# Patient Record
Sex: MALE | Race: WHITE | Employment: FULL TIME | URBAN - METROPOLITAN AREA
[De-identification: names, ages, dates, MRNs, and addresses within clinical notes are randomized per-mention and may not be internally consistent; named-entity substitution may affect disease eponyms.]

---

## 2017-06-28 ENCOUNTER — OFFICE VISIT (OUTPATIENT)
Dept: ORTHOPEDIC SURGERY | Age: 57
End: 2017-06-28

## 2017-06-28 VITALS
HEART RATE: 85 BPM | HEIGHT: 68 IN | WEIGHT: 285.06 LBS | SYSTOLIC BLOOD PRESSURE: 131 MMHG | DIASTOLIC BLOOD PRESSURE: 85 MMHG | BODY MASS INDEX: 43.2 KG/M2

## 2017-06-28 DIAGNOSIS — M25.562 PAIN, JOINT, KNEE, LEFT: Primary | ICD-10-CM

## 2017-06-28 PROCEDURE — 99214 OFFICE O/P EST MOD 30 MIN: CPT | Performed by: ORTHOPAEDIC SURGERY

## 2017-06-28 PROCEDURE — 73562 X-RAY EXAM OF KNEE 3: CPT | Performed by: ORTHOPAEDIC SURGERY

## 2017-06-28 RX ORDER — DICLOFENAC SODIUM 75 MG/1
75 TABLET, DELAYED RELEASE ORAL 2 TIMES DAILY
Qty: 60 TABLET | Refills: 1 | Status: SHIPPED | OUTPATIENT
Start: 2017-06-28 | End: 2017-06-30 | Stop reason: SDUPTHER

## 2017-06-30 RX ORDER — DICLOFENAC SODIUM 75 MG/1
75 TABLET, DELAYED RELEASE ORAL 2 TIMES DAILY
Qty: 60 TABLET | Refills: 1 | Status: SHIPPED | OUTPATIENT
Start: 2017-06-30 | End: 2017-08-28 | Stop reason: SDUPTHER

## 2017-07-11 ENCOUNTER — OFFICE VISIT (OUTPATIENT)
Dept: ORTHOPEDIC SURGERY | Age: 57
End: 2017-07-11

## 2017-07-11 VITALS
DIASTOLIC BLOOD PRESSURE: 74 MMHG | BODY MASS INDEX: 43.2 KG/M2 | WEIGHT: 285.06 LBS | SYSTOLIC BLOOD PRESSURE: 136 MMHG | HEART RATE: 87 BPM | HEIGHT: 68 IN

## 2017-07-11 DIAGNOSIS — E11.9 CONTROLLED TYPE 2 DIABETES MELLITUS WITHOUT COMPLICATION, UNSPECIFIED LONG TERM INSULIN USE STATUS: Primary | ICD-10-CM

## 2017-07-11 PROCEDURE — 99202 OFFICE O/P NEW SF 15 MIN: CPT | Performed by: PODIATRIST

## 2017-07-11 RX ORDER — PREDNISONE 10 MG/1
TABLET ORAL
COMMUNITY
Start: 2017-03-13 | End: 2017-12-14

## 2017-08-28 RX ORDER — DICLOFENAC SODIUM 75 MG/1
75 TABLET, DELAYED RELEASE ORAL 2 TIMES DAILY
Qty: 60 TABLET | Refills: 1 | Status: SHIPPED | OUTPATIENT
Start: 2017-08-28 | End: 2017-08-29 | Stop reason: SDUPTHER

## 2017-08-30 RX ORDER — DICLOFENAC SODIUM 75 MG/1
75 TABLET, DELAYED RELEASE ORAL 2 TIMES DAILY
Qty: 60 TABLET | Refills: 1 | Status: SHIPPED | OUTPATIENT
Start: 2017-08-30 | End: 2017-12-19 | Stop reason: HOSPADM

## 2017-10-18 ENCOUNTER — OFFICE VISIT (OUTPATIENT)
Dept: ORTHOPEDIC SURGERY | Age: 57
End: 2017-10-18

## 2017-10-18 VITALS — WEIGHT: 285.06 LBS | HEIGHT: 68 IN | BODY MASS INDEX: 43.2 KG/M2

## 2017-10-18 DIAGNOSIS — M17.12 PRIMARY OSTEOARTHRITIS OF LEFT KNEE: Primary | ICD-10-CM

## 2017-10-18 PROCEDURE — 20611 DRAIN/INJ JOINT/BURSA W/US: CPT | Performed by: ORTHOPAEDIC SURGERY

## 2017-10-18 PROCEDURE — 99213 OFFICE O/P EST LOW 20 MIN: CPT | Performed by: ORTHOPAEDIC SURGERY

## 2017-10-18 PROCEDURE — 76942 ECHO GUIDE FOR BIOPSY: CPT | Performed by: ORTHOPAEDIC SURGERY

## 2017-10-18 PROCEDURE — L1812 KO ELASTIC W/JOINTS PRE OTS: HCPCS | Performed by: ORTHOPAEDIC SURGERY

## 2017-10-18 NOTE — PROGRESS NOTES
CHIEF COMPLAINT:    Chief Complaint   Patient presents with    Follow-up     LEFT KNEE- WALKING LAST FRIDAY AND FELT A SHARP PAIN WHILE STEPPING DOWN. PAIN HAS BEEN INCREASING SINCE. HISTORY OF PRESENT ILLNESS:                The patient is a 65 y.o. male returns to clinic with continued left knee pain. We've seen this patient in the past for left knee pain. He does have known moderate arthritic changes. They've of this past weekend, he aggravated his knee when walking. He is planning on traveling out of the state tomorrow. Past Medical History:   Diagnosis Date    Pneumonia 8/2014    Reflux           The pain assessment was noted & is as follows:  Pain Assessment  Location of Pain: Knee  Location Modifiers: Left  Severity of Pain: 8  Quality of Pain: Aching, Sharp  Duration of Pain: Persistent  Frequency of Pain: Intermittent  Aggravating Factors: Bending, Exercise, Kneeling, Squatting, Walking  Limiting Behavior: Yes  Relieving Factors: Rest]      Work Status/Functionality:     Past Medical History: Medical history form was reviewed today & can be found in the media tab  Past Medical History:   Diagnosis Date    Pneumonia 8/2014    Reflux       Past Surgical History:     Past Surgical History:   Procedure Laterality Date    COLONOSCOPY      5/6/2011    HAND SURGERY Left     trigger thumb    KNEE ARTHROSCOPY Right 10/30/2014    TONSILLECTOMY      VASECTOMY      WISDOM TOOTH EXTRACTION       Current Medications:     Current Outpatient Prescriptions:     diclofenac (VOLTAREN) 75 MG EC tablet, Take 1 tablet by mouth 2 times daily, Disp: 60 tablet, Rfl: 1    predniSONE (DELTASONE) 10 MG tablet, 40mg x 2 days, 30mg x 2 days, 20mg x 2 days, 10mg x 2 days. , Disp: , Rfl:     Liraglutide (VICTOZA) 18 MG/3ML SOPN SC injection, DIAL AND INJECT SUBCUTANEOUSLY 1.8MG DAILY, Disp: , Rfl:     glimepiride (AMARYL) 4 MG tablet, , Disp: , Rfl: 3    ibuprofen (ADVIL) 600 MG tablet, Take 1 tablet by mouth every 6 hours as needed for Pain., Disp: 40 tablet, Rfl: 0  Allergies:  Review of patient's allergies indicates no known allergies. Social History:    reports that he has never smoked. He has never used smokeless tobacco. He reports that he drinks alcohol. He reports that he does not use drugs. Family History:   Family History   Problem Relation Age of Onset    Heart Disease Mother     High Blood Pressure Mother     Heart Disease Father     Diabetes Father     High Blood Pressure Father     Heart Disease Paternal Grandfather        REVIEW OF SYSTEMS:   For new problems, a full review of systems will be found scanned in the patient's chart. CONSTITUTIONAL: Denies unexplained weight loss, fevers, chills   NEUROLOGICAL: Denies unsteady gait or progressive weakness  SKIN: Denies skin changes, delayed healing, rash, itching       PHYSICAL EXAM:    Vitals: Height 5' 8.11\" (1.73 m), weight 285 lb 0.9 oz (129.3 kg). GENERAL EXAM:  · General Apparence: Patient is adequately groomed with no evidence of malnutrition. · Orientation: The patient is oriented to time, place and person. · Mood & Affect:The patient's mood and affect are appropriate       Left knee PHYSICAL EXAMINATION:  · Inspection:  No visible deformity. No significant edema, erythema or ecchymosis. · Palpation:  Tenderness to palpation along the medial and lateral joint spaces      · Range of Motion: Normal range of motion    · Strength: No gross strength deficits are noted    · Special Tests:  Varus and valgus stress testing are normal.  Negative Homans testing. Negative Lockman's testing. · Skin:  There are no rashes, ulcerations or lesions. · Gait & station: Mildly antalgic      · Additional Examinations:        Right Lower Extremity: Examination of the right lower extremity does not show any tenderness, deformity or injury. Range of motion is unremarkable. There is no gross instability.   There are no rashes, ulcerations or lesions. Strength and tone are normal.      Diagnostic Testing:          Orders     Orders Placed This Encounter   Procedures    US Guided Needle Placement    KS ARTHROCENTESIS ASPIR&/INJ MAJOR JT/BURSA W/O US    KS METHYLPREDNISOLONE 40 MG INJ    Breg Economy Hinged Knee WrapAround Brace     Patient was prescribed a Breg Economy Hinged Wrap Around Knee Brace. The left knee will require stabilization / immobilization from this semi-rigid / rigid orthosis to improve their function. The orthosis will assist in protecting the affected area, provide functional support and facilitate healing. The patient was educated and fit by a healthcare professional with expert knowledge and specialization in brace application while under the direct supervision of the treating physician. Verbal and written instructions for the use of and application of this item were provided. They were instructed to contact the office immediately should the brace result in increased pain, decreased sensation, increased swelling or worsening of the condition. Assessment / Treatment Plan:     1. Osteoarthritis left knee, possible degenerative medial meniscus tear    I discussed with the patient the nature of osteoarthritis of the knee. We talked about treatment of arthritis and the various options that are involved with this. The patient understands that the treatments can vary from essentially doing nothing to a total joint replacement arthroplasty for arthritis. I then went on to describe the utilization of glucosamine and chondroitin sulfate as a joint nutrition product. We talked about the fact that this is essentially a joint vitamin with typically minimal side effects. We also talked about utilization of prescription over-the-counter anti-inflammatory medications as the next option. We also discussed the possibility of brace wear or orthotic wear if the patient has significant varus alignment.  We then went on

## 2017-11-22 ENCOUNTER — TELEPHONE (OUTPATIENT)
Dept: ORTHOPEDIC SURGERY | Age: 57
End: 2017-11-22

## 2017-11-22 DIAGNOSIS — S83.242A TEAR OF MEDIAL MENISCUS OF LEFT KNEE, UNSPECIFIED TEAR TYPE, UNSPECIFIED WHETHER OLD OR CURRENT TEAR, INITIAL ENCOUNTER: Primary | ICD-10-CM

## 2017-12-04 ENCOUNTER — OFFICE VISIT (OUTPATIENT)
Dept: ORTHOPEDIC SURGERY | Age: 57
End: 2017-12-04

## 2017-12-04 VITALS
DIASTOLIC BLOOD PRESSURE: 90 MMHG | WEIGHT: 285.06 LBS | HEIGHT: 68 IN | SYSTOLIC BLOOD PRESSURE: 169 MMHG | BODY MASS INDEX: 43.2 KG/M2 | HEART RATE: 83 BPM

## 2017-12-04 DIAGNOSIS — M17.12 PRIMARY OSTEOARTHRITIS OF LEFT KNEE: ICD-10-CM

## 2017-12-04 DIAGNOSIS — S83.212A BUCKET-HANDLE TEAR OF MEDIAL MENISCUS OF LEFT KNEE AS CURRENT INJURY, INITIAL ENCOUNTER: Primary | ICD-10-CM

## 2017-12-04 DIAGNOSIS — S83.242A TEAR OF MEDIAL MENISCUS OF LEFT KNEE, UNSPECIFIED TEAR TYPE, UNSPECIFIED WHETHER OLD OR CURRENT TEAR, INITIAL ENCOUNTER: Primary | ICD-10-CM

## 2017-12-04 PROCEDURE — 99213 OFFICE O/P EST LOW 20 MIN: CPT | Performed by: ORTHOPAEDIC SURGERY

## 2017-12-04 NOTE — PROGRESS NOTES
CHIEF COMPLAINT:    Chief Complaint   Patient presents with    Results     MRI LEFT KNEE        HISTORY OF PRESENT ILLNESS:                The patient is a 65 y.o. male  who returns to clinic to review his MRI results of his LEFT knee. He states that he is having significant pain over the medial aspect of the knee he reports pain with weightbearing. Past Medical History:   Diagnosis Date    Pneumonia 8/2014    Reflux           The pain assessment was noted & is as follows:  Pain Assessment  Location of Pain: Knee  Location Modifiers: Left  Severity of Pain: 5  Quality of Pain: Aching  Duration of Pain: Persistent  Frequency of Pain: Intermittent  Aggravating Factors: Bending, Exercise, Standing, Walking  Limiting Behavior: Yes  Relieving Factors: Rest]      Work Status/Functionality:     Past Medical History: Medical history form was reviewed today & can be found in the media tab  Past Medical History:   Diagnosis Date    Pneumonia 8/2014    Reflux       Past Surgical History:     Past Surgical History:   Procedure Laterality Date    COLONOSCOPY      5/6/2011    HAND SURGERY Left     trigger thumb    KNEE ARTHROSCOPY Right 10/30/2014    TONSILLECTOMY      VASECTOMY      WISDOM TOOTH EXTRACTION       Current Medications:     Current Outpatient Prescriptions:     diclofenac (VOLTAREN) 75 MG EC tablet, Take 1 tablet by mouth 2 times daily, Disp: 60 tablet, Rfl: 1    predniSONE (DELTASONE) 10 MG tablet, 40mg x 2 days, 30mg x 2 days, 20mg x 2 days, 10mg x 2 days. , Disp: , Rfl:     Liraglutide (VICTOZA) 18 MG/3ML SOPN SC injection, DIAL AND INJECT SUBCUTANEOUSLY 1.8MG DAILY, Disp: , Rfl:     glimepiride (AMARYL) 4 MG tablet, , Disp: , Rfl: 3    ibuprofen (ADVIL) 600 MG tablet, Take 1 tablet by mouth every 6 hours as needed for Pain., Disp: 40 tablet, Rfl: 0  Allergies:  Review of patient's allergies indicates no known allergies. Social History:    reports that he has never smoked.  He has never used

## 2017-12-05 ENCOUNTER — TELEPHONE (OUTPATIENT)
Dept: ORTHOPEDIC SURGERY | Age: 57
End: 2017-12-05

## 2017-12-19 ENCOUNTER — HOSPITAL ENCOUNTER (OUTPATIENT)
Dept: SURGERY | Age: 57
Discharge: HOME | End: 2017-12-19
Attending: ORTHOPAEDIC SURGERY | Admitting: ORTHOPAEDIC SURGERY

## 2017-12-19 VITALS
OXYGEN SATURATION: 96 % | WEIGHT: 250 LBS | DIASTOLIC BLOOD PRESSURE: 80 MMHG | HEIGHT: 68 IN | BODY MASS INDEX: 37.89 KG/M2 | RESPIRATION RATE: 20 BRPM | HEART RATE: 85 BPM | TEMPERATURE: 97.7 F | SYSTOLIC BLOOD PRESSURE: 134 MMHG

## 2017-12-19 LAB
GLUCOSE BLD-MCNC: 206 MG/DL (ref 70–99)
GLUCOSE BLD-MCNC: 248 MG/DL (ref 70–99)
PERFORMED ON: ABNORMAL
PERFORMED ON: ABNORMAL

## 2017-12-19 RX ORDER — OXYCODONE HYDROCHLORIDE AND ACETAMINOPHEN 5; 325 MG/1; MG/1
1 TABLET ORAL PRN
Status: COMPLETED | OUTPATIENT
Start: 2017-12-19 | End: 2017-12-19

## 2017-12-19 RX ORDER — MEPERIDINE HYDROCHLORIDE 50 MG/ML
12.5 INJECTION INTRAMUSCULAR; INTRAVENOUS; SUBCUTANEOUS EVERY 5 MIN PRN
Status: DISCONTINUED | OUTPATIENT
Start: 2017-12-19 | End: 2017-12-20 | Stop reason: HOSPADM

## 2017-12-19 RX ORDER — ONDANSETRON 2 MG/ML
4 INJECTION INTRAMUSCULAR; INTRAVENOUS EVERY 30 MIN PRN
Status: DISCONTINUED | OUTPATIENT
Start: 2017-12-19 | End: 2017-12-20 | Stop reason: HOSPADM

## 2017-12-19 RX ORDER — ASPIRIN 325 MG
325 TABLET ORAL DAILY
Qty: 14 TABLET | Refills: 0 | Status: SHIPPED | OUTPATIENT
Start: 2017-12-19 | End: 2018-01-02

## 2017-12-19 RX ORDER — HYDROCODONE BITARTRATE AND ACETAMINOPHEN 5; 325 MG/1; MG/1
TABLET ORAL
Qty: 40 TABLET | Refills: 0 | Status: SHIPPED | OUTPATIENT
Start: 2017-12-19

## 2017-12-19 RX ORDER — DICLOFENAC SODIUM 75 MG/1
75 TABLET, DELAYED RELEASE ORAL 2 TIMES DAILY
Qty: 60 TABLET | Refills: 3 | Status: SHIPPED | OUTPATIENT
Start: 2017-12-19 | End: 2018-05-14 | Stop reason: SDUPTHER

## 2017-12-19 RX ORDER — DIPHENHYDRAMINE HYDROCHLORIDE 50 MG/ML
6.25 INJECTION INTRAMUSCULAR; INTRAVENOUS
Status: ACTIVE | OUTPATIENT
Start: 2017-12-19 | End: 2017-12-19

## 2017-12-19 RX ORDER — OXYCODONE HYDROCHLORIDE AND ACETAMINOPHEN 5; 325 MG/1; MG/1
2 TABLET ORAL PRN
Status: COMPLETED | OUTPATIENT
Start: 2017-12-19 | End: 2017-12-19

## 2017-12-19 RX ORDER — SODIUM CHLORIDE, SODIUM LACTATE, POTASSIUM CHLORIDE, CALCIUM CHLORIDE 600; 310; 30; 20 MG/100ML; MG/100ML; MG/100ML; MG/100ML
INJECTION, SOLUTION INTRAVENOUS CONTINUOUS
Status: DISCONTINUED | OUTPATIENT
Start: 2017-12-19 | End: 2017-12-20 | Stop reason: HOSPADM

## 2017-12-19 RX ORDER — LABETALOL HYDROCHLORIDE 5 MG/ML
5 INJECTION, SOLUTION INTRAVENOUS
Status: DISCONTINUED | OUTPATIENT
Start: 2017-12-19 | End: 2017-12-20 | Stop reason: HOSPADM

## 2017-12-19 RX ORDER — HYDRALAZINE HYDROCHLORIDE 20 MG/ML
5 INJECTION INTRAMUSCULAR; INTRAVENOUS EVERY 30 MIN PRN
Status: DISCONTINUED | OUTPATIENT
Start: 2017-12-19 | End: 2017-12-20 | Stop reason: HOSPADM

## 2017-12-19 RX ADMIN — OXYCODONE HYDROCHLORIDE AND ACETAMINOPHEN 1 TABLET: 5; 325 TABLET ORAL at 14:25

## 2017-12-19 RX ADMIN — SODIUM CHLORIDE, SODIUM LACTATE, POTASSIUM CHLORIDE, CALCIUM CHLORIDE: 600; 310; 30; 20 INJECTION, SOLUTION INTRAVENOUS at 12:12

## 2017-12-19 ASSESSMENT — PAIN SCALES - GENERAL
PAINLEVEL_OUTOF10: 6
PAINLEVEL_OUTOF10: 0

## 2017-12-19 ASSESSMENT — PAIN - FUNCTIONAL ASSESSMENT: PAIN_FUNCTIONAL_ASSESSMENT: 0-10

## 2017-12-19 NOTE — PROGRESS NOTES
Discharge in no distress: accompanied pt to passenger side of car with family/significant other driving. Resp WNL. Pt's significant other verbalized understanding of d/c instructions and verbalizes no additional questions. Pain diminished.

## 2017-12-19 NOTE — BRIEF OP NOTE
Brief Postoperative Note    Christiano Funes  YOB: 1960  [id number]    Pre-operative Diagnosis: Left knee MMT, LMT    Post-operative Diagnosis: Same    Procedure: Left knee arthroscopy, PMM, PLM, chondroplasty,synovectomy    Anesthesia: General    Surgeons/Assistants: Christiano Brown MD, SA    Estimated Blood Loss: less than 50     Complications: None    Specimens: Was Not Obtained    Findings: OA, MMT,LMT    Electronically signed by JANICE Purcell on 12/19/2017 at 1:50 PM

## 2017-12-19 NOTE — ANESTHESIA PRE-OP
250 lb (113.4 kg)   Height: 5' 8\" (1.727 m)                                              BP Readings from Last 3 Encounters:   12/19/17 139/84   12/04/17 (!) 169/90   07/11/17 136/74       NPO Status: Time of last liquid consumption: 0000                        Time of last solid consumption: 2000                        Date of last liquid consumption: 12/18/17                        Date of last solid food consumption: 12/18/17    BMI:   Wt Readings from Last 3 Encounters:   12/19/17 250 lb (113.4 kg)   12/14/17 250 lb (113.4 kg)   12/04/17 285 lb 0.9 oz (129.3 kg)     Body mass index is 38.01 kg/m². Anesthesia Evaluation  Patient summary reviewed and Nursing notes reviewed no history of anesthetic complications:   Airway: Mallampati: III     Neck ROM: full   Dental:          Pulmonary:   (+) pneumonia:                             Cardiovascular:                      Neuro/Psych:               GI/Hepatic/Renal:            ROS comment: obesity. Endo/Other:    (+) Type II DM, , .                 Abdominal:           Vascular:                                        Anesthesia Plan      general     ASA 2     (Medications & allergies reviewed  All available lab & EKG data reviewed)  Induction: intravenous. Anesthetic plan and risks discussed with patient. Plan discussed with CRNA.                   Conan Nageotte, MD   12/19/2017

## 2017-12-19 NOTE — PROGRESS NOTES
Pt instructed on crutches/ demonstrated use prior to surgery- sized according to pt height/ emphasized to the pt not to lean on his/her axillae while ambulating with crutches--follow crutch educational pamphlet provided (pt/sig other verbalized understanding)

## 2017-12-19 NOTE — PROGRESS NOTES
POCT      Blood Glucose:  248      (Normal Range 70-99)    PT:      (Normal Range 10-12. 8)    INR:      (Normal Range 0.85-1.15)

## 2017-12-20 NOTE — OP NOTE
procedure. He voiced understanding to this and elected to  have the procedure done. He realized concerns regarding infection, deep  vein thrombosis, pulmonary embolism, arthrofibrosis, delayed  rehabilitation, continued arthritis pain, anesthetic complications  including death, cardiopulmonary issues etc.  All questions were answered. I did meet with this gentleman in the preprocedure holding area, signed his  knee and answered any further questions. DETAILS OF SURGERY:  The patient was taken to the operating room and placed  on the operating table in supine position. General anesthesia was induced  and maintained without difficulty. Examination under anesthesia  demonstrated 1+ effusion with patellofemoral crepitus, but no other  significant findings. After the leg was positioned and prepped and draped, tricompartmental  examination was performed. The medial compartment was examined first.  In contrast with the MRI scan  reported, the patient had a large medial meniscus tear posteriorly with  completely unstable posterior horn of the medial meniscus. I have to take  back the posterior 25% of meniscus and trimmed this back to a carefully  balanced stable rim. The medial femoral condyle, medial tibial plateau were relatively free of  disease with just grade 1 changes. The lateral compartment was examined  next. The patient had bone-on-bone findings along the areas of the lateral  compartment, tibial plateau and lateral femoral condyle. There was also a  small anterior horn lateral meniscus tear. This was trimmed back with a  4.5 incisor resector. The patellofemoral articulation done also demonstrated grade 3 and grade 4  chondromalacia. A chondroplasty was again performed here as well utilizing  the 4.5 incisor. The area of the patellofemoral chondromalacia was fairly extensive with the  footprint on the groove of about 2 x 3 cm in size. There was also really dense synovitis.

## 2017-12-20 NOTE — ANESTHESIA POST-OP
Anesthesia Post-op Note    Patient: Steffi Hatfield    Procedure(s) Performed:   Left knee arthroscopy, PMM, PLM, chondroplasty,synovectomy    DOS : 12.19.17    Surgeon: Marley Carlin     Anesthesia type: general    Post-op assessment:  Anesthetic Problems: no   Last Vitals:    Vitals:    12/19/17 1443   BP: 134/80   Pulse: 85   Resp: 20   Temp:    SpO2: 96%     Cardiovascular System Stable: yes  Respiratory Function: Airway Patent yes  ETT no  Ventilator no  Level of consciousness: awake, alert and oriented  Post-op pain: adequate analgesia  Hydration Adequate: yes  Nausea/Vomiting:no  Other Issues:

## 2017-12-21 ENCOUNTER — HOSPITAL ENCOUNTER (OUTPATIENT)
Dept: PHYSICAL THERAPY | Age: 57
Discharge: HOME | End: 2017-12-31
Admitting: ORTHOPAEDIC SURGERY

## 2017-12-21 NOTE — FLOWSHEET NOTE
tissue/joints for the purpose of modulating pain, promoting relaxation,  increasing ROM, reducing/eliminating soft tissue swelling/inflammation/restriction, improving soft tissue extensibility and allowing for proper ROM for normal function with self care, mobility, lifting and ambulation. Modalities:  Cp x 12 min    Charges:  Timed Code Treatment Minutes: 20   Total Treatment Minutes: 55     [x] EVAL (LOW) 92413 (typically 20 minutes face-to-face)  [] EVAL (MOD) 44868 (typically 30 minutes face-to-face)  [] EVAL (HIGH) 63752 (typically 45 minutes face-to-face)  [] RE-EVAL     [x] EN(76334) x  1   [] IONTO  [] NMR (25885) x      [] VASO  [] Manual (73816) x       [] Other:  [] TA x       [] Mech Traction (52170)  [] ES(attended) (01098)      [] ES (un) (33303):     GOALS: Patient stated goal: be active    Therapist goals for Patient:   Short Term Goals: To be achieved in: 2 weeks  1. Independent in HEP and progression per patient tolerance, in order to prevent re-injury. 2. Patient will have a decrease in pain to facilitate improvement in movement, function, and ADLs as indicated by Functional Deficits. Long Term Goals: To be achieved in: 6 weeks  1. Disability index score of 15% or less for the LEFS to assist with reaching prior level of function. 2. Patient will demonstrate increased AROM to 0-125 to allow for proper joint functioning as indicated by patients Functional Deficits. 3. Patient will demonstrate an increase in Strength to good proximal hip strength and control, within 5lb HHD in LE to allow for proper functional mobility as indicated by patients Functional Deficits. 4. Patient will return to ADL functional activities without increased symptoms or restriction. Progression Towards Functional goals:  [] Patient is progressing as expected towards functional goals listed. [] Progression is slowed due to complexities listed.   [] Progression has been slowed due to

## 2017-12-21 NOTE — PLAN OF CARE
[facility], [address]  Phone: [phone]  Fax [phone]     Physical Therapy Certification    Dear Referring Practitioner: Dr. Steffanie Nixon,    We had the pleasure of evaluating the following patient for physical therapy services at [address] [place] [address]. A summary of our findings can be found in the initial assessment below. This includes our plan of care. If you have any questions or concerns regarding these findings, please do not hesitate to contact me at the office phone number checked above. Thank you for the referral.       Physician Signature:_______________________________Date:__________________  By signing above (or electronic signature), therapists plan is approved by physician    Patient: Boo Villarreal   : 1960   MRN: [id number]  Referring Physician: Referring Practitioner: Dr. Steffanie Nixon      Evaluation Date: 2017      Medical Diagnosis Information:  Diagnosis: V58.748T (ICD-10-CM) - Bucket-handle tear of medial meniscus of left knee as current injury, subsequent encounter. s/p partial medial and lateral meniscectomy and chondroplasty on 17   Treatment Diagnosis: gait abnormality R26.9, left knee effucsion M25.462, muscle weakness left leg M62.82                                         Insurance information: PT Insurance Information: [place] 0 deductible 2017     Precautions/ Contra-indications: diabetes type 2  Latex Allergy:  [x]NO      []YES  Preferred Language for Healthcare:   [x]English       []other:    SUBJECTIVE: Patient stated complaint:he was having gradual increased pain over time. Had an MRI. Had surgery 2 days ago (partial medial and lateral meniscectomy, chondroplastic). Reports he is feeling great. Relevant Medical History:right knee meniscectomy 2 yrs ago.   Functional Disability Index: 28%    Pain Scale: 0/10  Easing factors:   Provocative Musculoskeletal conditions   []Disc pathology   []Congenital spine pathologies   []Prior surgical intervention  []Osteoporosis (M81.8)  []Osteopenia (M85.8)   Endocrine conditions   []Hypothyroid (E03.9)  []Hyperthyroid Gastrointestinal conditions   []Constipation (M70.65)   Metabolic conditions   []Morbid obesity (E66.01)  [x]Diabetes type 1(E10.65) or 2 (E11.65)   []Neuropathy (G60.9)     Pulmonary conditions   []Asthma (J45)  []Coughing   []COPD (J44.9)   Psychological Disorders  []Anxiety (F41.9)  []Depression (F32.9)   []Other:   []Other:          Barriers to/and or personal factors that will affect rehab potential:              []Age  []Sex              []Motivation/Lack of Motivation                        []Co-Morbidities              []Cognitive Function, education/learning barriers              []Environmental, home barriers              []profession/work barriers  []past PT/medical experience  []other:  Justification:     Falls Risk Assessment (30 days):   [x] Falls Risk assessed and no intervention required. [] Falls Risk assessed and Patient requires intervention due to being higher risk   TUG score (>12s at risk):     [] Falls education provided, including       G-Codes:  PT G-Codes  Functional Assessment Tool Used: LEFS  Score: 28$  Functional Limitation: Mobility: Walking and moving around  Mobility: Walking and Moving Around Current Status (): At least 20 percent but less than 40 percent impaired, limited or restricted  Mobility: Walking and Moving Around Goal Status ():  At least 1 percent but less than 20 percent impaired, limited or restricted    ASSESSMENT:   Functional Impairments:     []Noted lumbar/proximal hip/LE joint hypomobility   [x]Decreased LE functional ROM   []Decreased core/proximal hip strength and neuromuscular control   [x]Decreased LE functional strength   []Reduced balance/proprioceptive control   []other:      Functional Activity Limitations (from functional that impact the plan of care  []3 personal factors and/or comorbidities that impact the plan of care  [x] An examination of body systems using standardized tests and measures addressing any of the following: body structures and functions (impairments), activity limitations, and/or participation restrictions;:  [] a total of 1-2 or more elements   [x] a total of 3 or more elements   [] a total of 4 or more elements   [x] A clinical presentation with:  [] stable and/or uncomplicated characteristics   [] evolving clinical presentation with changing characteristics  [] unstable and unpredictable characteristics;   [x] Clinical decision making of [x] low, [] moderate, [] high complexity using standardized patient assessment instrument and/or measurable assessment of functional outcome. [x] EVAL (LOW) 61757 (typically 20 minutes face-to-face)  [] EVAL (MOD) 02285 (typically 30 minutes face-to-face)  [] EVAL (HIGH) 39852 (typically 45 minutes face-to-face)  [] RE-EVAL       PLAN:   Frequency/Duration:  2 days per week for 4-6 Weeks:  Interventions:  [x]  Therapeutic exercise including: strength training, ROM, for Lower extremity and core   [x]  NMR activation and proprioception for LE, Glutes and Core   [x]  Manual therapy as indicated for LE, Hip and spine to include: Dry Needling/IASTM, STM, PROM, Gr I-IV mobilizations, manipulation. [x] Modalities as needed that may include: thermal agents, E-stim, Biofeedback, US, iontophoresis as indicated  [x] Patient education on joint protection, postural re-education, activity modification, progression of HEP. HEP instruction: (see scanned forms)    GOALS:  Patient stated goal: be active    Therapist goals for Patient:   Short Term Goals: To be achieved in: 2 weeks  1. Independent in HEP and progression per patient tolerance, in order to prevent re-injury.    2. Patient will have a decrease in pain to facilitate improvement in movement, function, and ADLs as indicated by

## 2017-12-22 ENCOUNTER — OFFICE VISIT (OUTPATIENT)
Dept: ORTHOPEDIC SURGERY | Age: 57
End: 2017-12-22

## 2017-12-22 VITALS
SYSTOLIC BLOOD PRESSURE: 141 MMHG | WEIGHT: 250 LBS | DIASTOLIC BLOOD PRESSURE: 90 MMHG | HEIGHT: 68 IN | HEART RATE: 77 BPM | BODY MASS INDEX: 37.89 KG/M2

## 2017-12-22 DIAGNOSIS — S83.212A BUCKET-HANDLE TEAR OF MEDIAL MENISCUS OF LEFT KNEE AS CURRENT INJURY, INITIAL ENCOUNTER: Primary | ICD-10-CM

## 2017-12-22 DIAGNOSIS — M25.562 PAIN, JOINT, KNEE, LEFT: ICD-10-CM

## 2017-12-22 DIAGNOSIS — S83.242A TEAR OF MEDIAL MENISCUS OF LEFT KNEE, UNSPECIFIED TEAR TYPE, UNSPECIFIED WHETHER OLD OR CURRENT TEAR, INITIAL ENCOUNTER: ICD-10-CM

## 2017-12-22 DIAGNOSIS — M17.12 PRIMARY OSTEOARTHRITIS OF LEFT KNEE: ICD-10-CM

## 2017-12-22 DIAGNOSIS — Z98.890 H/O ARTHROSCOPY OF KNEE: ICD-10-CM

## 2017-12-22 PROCEDURE — 99024 POSTOP FOLLOW-UP VISIT: CPT | Performed by: ORTHOPAEDIC SURGERY

## 2017-12-22 NOTE — PROGRESS NOTES
POSTOPERATIVE DIAGNOSES: Date Of surgery 12/19/2017  1. Tear of posterior horn of the medial meniscus left knee involving  posterior 25% of meniscus. 2.  Tear of the anterior horn of the lateral meniscus. 3.  Grade 3/4 chondromalacia of the patellofemoral articulation. 4.  Grade 3/4 chondromalacia of the lateral compartment. 5.  Tricompartmental synovitis. History of present illness: The patient returns today for their first postoperative visit after knee arthroscopy. Pain control has been satisfactory with oral medications. There have been no fevers or chills. Physical examination: Inspection reveals expected swelling. Incisions are clean, dry, and intact. No signs of infection. Range of motion limited by pain and swelling. There is no calf pain or signs of DVT with a negative Homans sign. Assessment/plan: The patient is doing well after knee arthroscopy. Surgical findings were reviewed toda. I have recommended ice, judicious use of the NSAIDs with GI precautions, and physical therapy to diminish swelling and restore both range of motion and strength. We will see the patient back in 3-4 weeks. The patient verbalized good understanding of the plan. I have personally performed and/or participated in the history, exam and medical decision making and agree with all pertinent clinical information. I have also reviewed and agree with the past medical, family and social history unless otherwise noted. This dictation was performed with a verbal recognition program (DRAGON) and it was checked for errors. It is possible that there are still dictated errors within this office note. If so, please bring any errors to my attention for an addendum. All efforts were made to ensure that this office note is accurate.           Marissa Calderon MD

## 2017-12-26 ENCOUNTER — HOSPITAL ENCOUNTER (OUTPATIENT)
Dept: PHYSICAL THERAPY | Age: 57
Discharge: HOME | End: 2017-12-26
Admitting: ORTHOPAEDIC SURGERY

## 2017-12-26 NOTE — FLOWSHEET NOTE
1x10    SLR 2x10 hep   LAQ 3x10 hep   Standing ham curls 3x10    Bike  5 min                        Manual Intervention                                   NMR re-education                                       Therapeutic Exercise and NMR EXR  [x] (18374) Provided verbal/tactile cueing for activities related to strengthening, flexibility, endurance, ROM for improvements in LE, proximal hip, and core control with self care, mobility, lifting, ambulation.  [] (79315) Provided verbal/tactile cueing for activities related to improving balance, coordination, kinesthetic sense, posture, motor skill, proprioception  to assist with LE, proximal hip, and core control in self care, mobility, lifting, ambulation and eccentric single leg control.      NMR and Therapeutic Activities:    [] (90793 or 48235) Provided verbal/tactile cueing for activities related to improving balance, coordination, kinesthetic sense, posture, motor skill, proprioception and motor activation to allow for proper function of core, proximal hip and LE with self care and ADLs  [] (57434) Gait Re-education- Provided training and instruction to the patient for proper LE, core and proximal hip recruitment and positioning and eccentric body weight control with ambulation re-education including up and down stairs     Home Exercise Program:    [x] (59646) Reviewed/Progressed HEP activities related to strengthening, flexibility, endurance, ROM of core, proximal hip and LE for functional self-care, mobility, lifting and ambulation/stair navigation   [] (01134)Reviewed/Progressed HEP activities related to improving balance, coordination, kinesthetic sense, posture, motor skill, proprioception of core, proximal hip and LE for self care, mobility, lifting, and ambulation/stair navigation      Manual Treatments:  PROM / STM / Oscillations-Mobs:  G-I, II, III, IV (PA's, Inf., Post.)  [] (73153) Provided manual therapy to mobilize LE, proximal hip and/or LS spine soft tissue/joints for the purpose of modulating pain, promoting relaxation,  increasing ROM, reducing/eliminating soft tissue swelling/inflammation/restriction, improving soft tissue extensibility and allowing for proper ROM for normal function with self care, mobility, lifting and ambulation. Modalities:  Cp x 12 min    Charges:  Timed Code Treatment Minutes: 40   Total Treatment Minutes: 55     [] EVAL (LOW) 30162 (typically 20 minutes face-to-face)  [] EVAL (MOD) 70308 (typically 30 minutes face-to-face)  [] EVAL (HIGH) 24667 (typically 45 minutes face-to-face)  [] RE-EVAL     [x] JT(68140) x  3   [] IONTO  [] NMR (17796) x      [] VASO  [] Manual (44243) x       [] Other:  [] TA x       [] Mech Traction (08815)  [] ES(attended) (82943)      [] ES (un) (95133):     GOALS: Patient stated goal: be active    Therapist goals for Patient:   Short Term Goals: To be achieved in: 2 weeks  1. Independent in HEP and progression per patient tolerance, in order to prevent re-injury. 2. Patient will have a decrease in pain to facilitate improvement in movement, function, and ADLs as indicated by Functional Deficits. Long Term Goals: To be achieved in: 6 weeks  1. Disability index score of 15% or less for the LEFS to assist with reaching prior level of function. 2. Patient will demonstrate increased AROM to 0-125 to allow for proper joint functioning as indicated by patients Functional Deficits. 3. Patient will demonstrate an increase in Strength to good proximal hip strength and control, within 5lb HHD in LE to allow for proper functional mobility as indicated by patients Functional Deficits. 4. Patient will return to ADL functional activities without increased symptoms or restriction. Progression Towards Functional goals:  [x] Patient is progressing as expected towards functional goals listed. [] Progression is slowed due to complexities listed.   [] Progression has been slowed due to

## 2017-12-29 ENCOUNTER — HOSPITAL ENCOUNTER (OUTPATIENT)
Dept: PHYSICAL THERAPY | Age: 57
Discharge: HOME | End: 2017-12-29
Admitting: ORTHOPAEDIC SURGERY

## 2017-12-29 NOTE — DISCHARGE SUMMARY
[facility],  [address]  Phone: [phone]  Fax [phone]       Physical Therapy Discharge  Date: 2017        Patient Name:  Leela Mireles    :  1960  MRN: [id number]  Referring Physician:Kev  Diagnosis: - Bucket-handle tear of medial meniscus of left knee as current injury, subsequent encounter                        ICD Code:S83.212D (ICD-10-CM)  [x] Surgical [] Conservative  Therapy Diagnosis/Practice Pattern:      Number of Comorbidities:  []0     [x]1-2    []3+  Total number of visits: 3   Reporting Period:   Beginning Date:17   End Date:17    OBJECTIVE  Test used Initial score Discharge Score   Pain Summary  0/10 1-2/10   Functional questionnaire LEFS 28% 15%   PROM Ext-flex 0-107 0-125   Strength ext Quad set good 5/5    flex n/a 5/5        Functional Limitation G-Code (if applicable):         PT G-Codes  Functional Assessment Tool Used: lefs  Score: 15%  Functional Limitation: Mobility: Walking and moving around  Mobility: Walking and Moving Around Current Status (): At least 1 percent but less than 20 percent impaired, limited or restricted  Mobility: Walking and Moving Around Goal Status (): At least 1 percent but less than 20 percent impaired, limited or restricted  Mobility: Walking and Moving Around Discharge Status ():  At least 1 percent but less than 20 percent impaired, limited or restricted   Test/tests used to determine % limitation:  Actual Score used to drive % limitation:    Treatment to date:  [x] Therapeutic Exercise    [x] Modalities:  [] Therapeutic Activity             []Ultrasound            []Electrical Stimulation  [x] Gait Training     []Cervical Traction    [] Lumbar Traction  [] Neuromuscular Re-education [x] Cold/hotpack         []Iontophoresis  [x] Instruction in HEP      Other:  [x] Manual Therapy                   [] ?           []   Assessment:  [x] All Goals were achieved. [] The following goals were achieved (#'s):  [] The following goals were not achieved for the following reasons:  Summary/assessmen of improvement as it relates to each goal:    Plan of Care:  [x] Discharge from Therapy Services due to:    Reason for Discharge:   [x] All goals achieved    [] Patient having surgery  [] Physician discontinued therapy  [] Insurance/Financial Limitations [] Patient did not return for follow up visits [] Home program/1 visit only   [] No subjective or objective improvement [] Plateaued   [] Patient was unable to adhere to the plan of care established at evaluation. [] Referred back to physician for re-evaluation and did not return.      [] Other:?       Electronically signed by:  Dimitrios Hamilton PT

## 2017-12-29 NOTE — FLOWSHEET NOTE
[facility], [address]  Phone: [phone]  Fax [phone]    Physical Therapy Daily Treatment Note  Date:  2017    Patient Name:  Ant Hackett    :  1960  MRN: [id number]  Restrictions/Precautions:    Medical/Treatment Diagnosis Information:  Diagnosis: S83.212D (ICD-10-CM) - Bucket-handle tear of medial meniscus of left knee as current injury, subsequent encounter. s/p partial medial and lateral meniscectomy and chondroplasty on 17  Treatment Diagnosis: gait abnormality R26.9, left knee effucsion M25.462, muscle weakness left leg B55.78  Insurance/Certification information:  PT Insurance Information: [place] 0 deductible 2017  Physician Information:  Referring Practitioner: Dr. Makayla Mccoy of care signed (Y/N):     Date of Patient follow up with Physician: Dr. Matilda Washington 1 week. G-Code (if applicable):      Date G-Code Applied:    PT G-Codes  Functional Assessment Tool Used: lefs  Score: 15%  Functional Limitation: Mobility: Walking and moving around  Mobility: Walking and Moving Around Current Status (): At least 1 percent but less than 20 percent impaired, limited or restricted  Mobility: Walking and Moving Around Goal Status (): At least 1 percent but less than 20 percent impaired, limited or restricted  Mobility: Walking and Moving Around Discharge Status (): At least 1 percent but less than 20 percent impaired, limited or restricted    Progress Note: [x]  Yes  []  No  Next due by: Visit #10       Latex Allergy:  [x]NO      []YES  Preferred Language for Healthcare:   [x]English       []other:    Visit # Insurance Allowable Requires auth   3 60    [x]no        []yes:       Pain level:  1-2/10     SUBJECTIVE:  Patient reports he had some burning pain in the middle of the night a couple of nights ago, but is fine now.   In the last couple of days he has more pain in the kneecap. He reports he is able to ambulate stairs reciprocally. He wants to make this his last session. OBJECTIVE: See eval  Observation: 12/29: minimal swelling, not warm to the touch  Test measurements:  12/29: see discharge summary  RESTRICTIONS/PRECAUTIONS: diabetes type 2 but under control    Exercises/Interventions:     Therapeutic Ex Sets/reps Notes   gastroc stretch  on incline board 5x30 sec  hep   longsit ham stretch 5x30 sec  hep   hep   hep   SAQ with add squeeze 2# 3x10    Supine bridge 3 sec hold 1x10    SLR 2x10 hep   LAQ 2# 3x10 hep   Standing ham curls 2# 3x10    Bike  5 min    Leg press  80# 3x10 bilat                   Manual Intervention     Prone knee flexion stretches 5 min                             NMR re-education                                       Therapeutic Exercise and NMR EXR  [x] (54079) Provided verbal/tactile cueing for activities related to strengthening, flexibility, endurance, ROM for improvements in LE, proximal hip, and core control with self care, mobility, lifting, ambulation.  [] (23050) Provided verbal/tactile cueing for activities related to improving balance, coordination, kinesthetic sense, posture, motor skill, proprioception  to assist with LE, proximal hip, and core control in self care, mobility, lifting, ambulation and eccentric single leg control.      NMR and Therapeutic Activities:    [] (22573 or 23638) Provided verbal/tactile cueing for activities related to improving balance, coordination, kinesthetic sense, posture, motor skill, proprioception and motor activation to allow for proper function of core, proximal hip and LE with self care and ADLs  [] (00111) Gait Re-education- Provided training and instruction to the patient for proper LE, core and proximal hip recruitment and positioning and eccentric body weight control with ambulation re-education including up and down stairs     Home Exercise Program:    [x] (93740) Reviewed/Progressed HEP activities functioning as indicated by patients Functional Deficits. (met)  3. Patient will demonstrate an increase in Strength to good proximal hip strength and control, within 5lb HHD in LE to allow for proper functional mobility as indicated by patients Functional Deficits. (met)  4. Patient will return to ADL functional activities without increased symptoms or restriction. (met)          Progression Towards Functional goals:  [x] Patient is progressing as expected towards functional goals listed. [] Progression is slowed due to complexities listed. [] Progression has been slowed due to co-morbidities.   [] Plan just implemented, too soon to assess goals progression  [] Other:     ASSESSMENT:  See eval    Treatment/Activity Tolerance:  [x] Patient tolerated treatment well [] Patient limited by fatique  [] Patient limited by pain  [] Patient limited by other medical complications  [] Other:     Prognosis: [x] Good [] Fair  [] Poor    Patient Requires Follow-up: [x] Yes  [] No    PLAN: Patient wishes to make this his last session  [] Continue per plan of care [] Alter current plan (see comments)  [] Plan of care initiated [] Hold pending MD visit [x] Discharge    Electronically signed by: Pennie Valentin PT

## 2018-01-01 ENCOUNTER — HOSPITAL ENCOUNTER (OUTPATIENT)
Dept: PHYSICAL THERAPY | Age: 58
Discharge: HOME | End: 2018-01-31
Attending: ORTHOPAEDIC SURGERY | Admitting: ORTHOPAEDIC SURGERY

## 2018-01-15 ENCOUNTER — OFFICE VISIT (OUTPATIENT)
Dept: ORTHOPEDIC SURGERY | Age: 58
End: 2018-01-15

## 2018-01-15 VITALS — HEIGHT: 68 IN | WEIGHT: 250 LBS | BODY MASS INDEX: 37.89 KG/M2

## 2018-01-15 DIAGNOSIS — Z98.890 H/O ARTHROSCOPY OF KNEE: Primary | ICD-10-CM

## 2018-01-15 PROCEDURE — 99024 POSTOP FOLLOW-UP VISIT: CPT | Performed by: PHYSICIAN ASSISTANT

## 2018-02-01 ENCOUNTER — HOSPITAL ENCOUNTER (OUTPATIENT)
Dept: PHYSICAL THERAPY | Age: 58
Discharge: HOME | End: 2018-02-28
Attending: ORTHOPAEDIC SURGERY | Admitting: ORTHOPAEDIC SURGERY

## 2018-04-30 ENCOUNTER — OFFICE VISIT (OUTPATIENT)
Dept: ORTHOPEDIC SURGERY | Age: 58
End: 2018-04-30

## 2018-04-30 VITALS
BODY MASS INDEX: 37.89 KG/M2 | DIASTOLIC BLOOD PRESSURE: 80 MMHG | WEIGHT: 250 LBS | SYSTOLIC BLOOD PRESSURE: 111 MMHG | HEART RATE: 92 BPM | HEIGHT: 68 IN

## 2018-04-30 DIAGNOSIS — M17.12 PRIMARY OSTEOARTHRITIS OF LEFT KNEE: ICD-10-CM

## 2018-04-30 DIAGNOSIS — Z98.890 H/O ARTHROSCOPY OF KNEE: Primary | ICD-10-CM

## 2018-04-30 PROCEDURE — 99213 OFFICE O/P EST LOW 20 MIN: CPT | Performed by: PHYSICIAN ASSISTANT

## 2018-04-30 PROCEDURE — 20611 DRAIN/INJ JOINT/BURSA W/US: CPT | Performed by: PHYSICIAN ASSISTANT

## 2018-05-14 RX ORDER — DICLOFENAC SODIUM 75 MG/1
75 TABLET, DELAYED RELEASE ORAL 2 TIMES DAILY
Qty: 60 TABLET | Refills: 3 | Status: SHIPPED | OUTPATIENT
Start: 2018-05-14

## 2018-07-02 ENCOUNTER — OFFICE VISIT (OUTPATIENT)
Dept: ORTHOPEDIC SURGERY | Age: 58
End: 2018-07-02

## 2018-07-02 VITALS — HEIGHT: 67 IN | BODY MASS INDEX: 39.24 KG/M2 | WEIGHT: 250 LBS

## 2018-07-02 DIAGNOSIS — M17.12 PRIMARY OSTEOARTHRITIS OF LEFT KNEE: ICD-10-CM

## 2018-07-02 DIAGNOSIS — Z98.890 H/O ARTHROSCOPY OF KNEE: ICD-10-CM

## 2018-07-02 DIAGNOSIS — M25.562 PAIN, JOINT, KNEE, LEFT: Primary | ICD-10-CM

## 2018-07-02 PROCEDURE — 20611 DRAIN/INJ JOINT/BURSA W/US: CPT | Performed by: ORTHOPAEDIC SURGERY

## 2018-07-02 PROCEDURE — 99213 OFFICE O/P EST LOW 20 MIN: CPT | Performed by: ORTHOPAEDIC SURGERY

## 2018-07-02 NOTE — PROGRESS NOTES
4CC BUPIVACAINE  NDC#-6205-5846-92  LOT#- [id number]  EXP:2/2019    4CC CARBOCAINE  NDC#-7377-3202-86  LOT#- [id number]  EXP: 1/2020    2CC DEPO  NDC#-5881-6488-87  LOT#- [id number]  EXP: 8/2020    SITE: LEFT KNEE

## 2018-07-02 NOTE — PROGRESS NOTES
CHIEF COMPLAINT:    Chief Complaint   Patient presents with    Knee Pain     CK left knee arthroscopy performed on 12/19/2017. CORTISONE 4/30/18. Feels as though he has pressure in knee,       HISTORY OF PRESENT ILLNESS:                The patient is a 63 y.o. male   Past Medical History:   Diagnosis Date    [facility]     Pneumonia 8/2014    Reflux     past hx      The patient presents status post is 12/19/17 arthroscopy of the knee and a 4/30/18 corticosteroid injection to the knee. He had about 2 months relief from the injection but his symptoms are returning. His wife had 2 steroid injections which seemed to help her after the 2. He still wants to be active with [employer] other activities. The pain assessment was noted & is as follows:  Pain Assessment  Location of Pain: Knee  Location Modifiers: Left  Severity of Pain: 6  Quality of Pain: Aching  Duration of Pain: Persistent  Frequency of Pain: Constant]      Work Status/Functionality:     Past Medical History: Medical history form was reviewed today & can be found in the media tab  Past Medical History:   Diagnosis Date    [facility]     Pneumonia 8/2014    Reflux     past hx      Past Surgical History:     Past Surgical History:   Procedure Laterality Date    COLONOSCOPY      5/6/2011    HAND SURGERY Left     trigger thumb and fingers X3    KNEE ARTHROSCOPY Right 10/30/2014    KNEE ARTHROSCOPY Left 12/19/2017    TONSILLECTOMY      VASECTOMY      WISDOM TOOTH EXTRACTION       Current Medications:     Current Outpatient Prescriptions:     diclofenac (VOLTAREN) 75 MG EC tablet, TAKE 1 TABLET BY MOUTH 2 TIMES DAILY, Disp: 60 tablet, Rfl: 3    HYDROcodone-acetaminophen (NORCO) 5-325 MG per tablet, Take 1-2 tablets by mouth every 4-6 hours as needed for pain following surgery. , Disp: 40 tablet, Rfl: 0    Multiple Vitamins-Minerals (MULTIVITAMIN PO), Take by mouth daily, Disp: , Rfl:     Liraglutide (VICTOZA) 18 MG/3ML SOPN SC injection, DIAL AND INJECT SUBCUTANEOUSLY 1.8MG DAILY, Disp: , Rfl:     glimepiride (AMARYL) 4 MG tablet, Take 4 mg by mouth daily , Disp: , Rfl: 3    aspirin (ALEJANDRA ASPIRIN) 325 MG tablet, Take 1 tablet by mouth daily for 14 days To prevent blood clot, Disp: 14 tablet, Rfl: 0  Allergies:  Patient has no known allergies. Social History:    reports that he has never smoked. He has never used smokeless tobacco. He reports that he drinks alcohol. He reports that he does not use drugs. Family History:   Family History   Problem Relation Age of Onset    Heart Disease Mother     High Blood Pressure Mother     Heart Disease Father     Diabetes Father     High Blood Pressure Father     Heart Disease Paternal Grandfather        REVIEW OF SYSTEMS:   For new problems, a full review of systems will be found scanned in the patient's chart. CONSTITUTIONAL: Denies unexplained weight loss, fevers, chills   NEUROLOGICAL: Denies unsteady gait or progressive weakness  SKIN: Denies skin changes, delayed healing, rash, itching       PHYSICAL EXAM:    Vitals: Height 5' 7\" (1.702 m), weight 250 lb (113.4 kg). GENERAL EXAM:  · General Apparence: Patient is adequately groomed with no evidence of malnutrition. · Orientation: The patient is oriented to time, place and person. · Mood & Affect:The patient's mood and affect are appropriate       LEFT knee PHYSICAL EXAMINATION:  · Inspection:  1+ effusion. No erythema or ecchymosis. · Palpation:  Tender along the medial joint space and parapatellar region. · Range of Motion: 5120 degrees. · Strength: Normal    · Special Tests:  Tenderness in the posterior fossa region. No ligamentous instability. · Skin:  There are no rashes, ulcerations or lesions. · Gait & station:       · Additional Examinations:        No distal soft tissue swelling. Normal pulses. Negative logroll examination.   Negative straight leg raise.      Diagnostic Testing:      3 x-ray views of the LEFT knee demonstrates moderate osteo-arthritic change of the knee with subluxation the femur on the tibia. Moderate patellofemoral osteoarthritis as well. Orders     Orders Placed This Encounter   Procedures    XR KNEE LEFT (3 VIEWS)         Assessment / Treatment Plan:     1. Moderate osteoarthritis of the LEFT knee. Talked about treatment options. 08 treatment optionsI discussed with the patient the nature of osteoarthritis of the knee. We talked about treatment of arthritis and the various options that are involved with this. The patient understands that the treatments can vary from essentially doing nothing to a total joint replacement arthroplasty for arthritis. I then went on to describe the utilization of glucosamine and chondroitin sulfate as a joint nutrition product. We talked about the fact that this is essentially a joint vitamin with typically minimal side effects. We also talked about utilization of prescription over-the-counter anti-inflammatory medications as the next option. We also discussed the possibility of brace wear or orthotic wear if the patient has significant varus alignment. We then went on to discuss the possibility of Visco supplementation with hyaluronate products. We talked about the typical course of this type of treatment and the fact that often times in the treatment for significant arthritis, this is successful less than half the time. We also talked about the corticosteroid injections and the fact that this can give a brief window of relief, but does not cure the problem; in fact, the pain often has a rebound effect in 6-10 weeks after the steroid has worn off. We also discussed arthroscopy surgery in attempts to debride the joint, but the fact that this is relatively unreliable treatment in the face of significant arthritis. It can occasionally be used, particularly if there is significant meniscus pathology. Lastly we discussed total joint replacement arthroplasty as the final and definitive step in treatment of arthritis. Patient realizes the magnitude of this type of treatment as well as having voiced a general understanding to the duration of the prosthesis. The patient voiced understanding to these continuum of treatment options. 2.  After the aforementioned discussion the patient is elected to repeat his cortisone injection. He understands and Visco really isn't an option given the fact that he has [employer]. LEFT knee injected todayI discussed in detail the risks, benefits and complications of an injection which included but are not limited to infection, skin reactions, hot swollen joint, and anaphylaxis with the patient. The patient verbalized understanding and gave informed consent for the injection. The patient's knee was flexed to 90° and the skin prepped using sterile alcohol solution. A sterile 22-gauge needle was inserted into the knee and the mixture of 4 mL of 2% Carbocaine, 4 mL of 0.25% Marcaine, and 2mL of 40 mg of Depo-Medrol was injected under sterile technique. The needle was withdrawn and the puncture site sealed with a Band-Aid. Technique: Under sterile conditions a SonVapotherm ultrasound unit with a variable frequency (6.0-15.0 MHz) linear transducer was used to localize the placement of a 22-gauge needle into the knee joint. Findings: Successful needle placement for knee injection. Final images were taken and saved for permanent record. The patient tolerated the injection well. The patient was instructed to call the office immediately if there is any pain, redness, warmth, fever, or chills. We also discussed the fact that he really needs to lose weight.   He has lost about 70 pounds in the last few but he is to continue to lose weight as he still is a BMI of 39.16.    3. I have personally performed and/or participated in the history, exam and medical decision making and agree with all pertinent clinical information. I have also reviewed and agree with the past medical, family and social history unless otherwise noted. This dictation was performed with a verbal recognition program (DRAGON) and it was checked for errors. It is possible that there are still dictated errors within this office note. If so, please bring any errors to my attention for an addendum. All efforts were made to ensure that this office note is accurate.           Rowena Akins MD

## 2018-10-01 RX ORDER — DICLOFENAC SODIUM 75 MG/1
TABLET, DELAYED RELEASE ORAL
Qty: 60 TABLET | Refills: 0 | Status: SHIPPED | OUTPATIENT
Start: 2018-10-01

## 2018-10-17 ENCOUNTER — OFFICE VISIT (OUTPATIENT)
Dept: ORTHOPEDIC SURGERY | Age: 58
End: 2018-10-17
Payer: COMMERCIAL

## 2018-10-17 DIAGNOSIS — M17.12 PRIMARY OSTEOARTHRITIS OF LEFT KNEE: Primary | ICD-10-CM

## 2018-10-17 NOTE — PATIENT INSTRUCTIONS
in your health, and be sure to contact your doctor if you have any problems. Where can you learn more? Go to [email or web address] and sign in to your [employer] account. Enter N616 in the [employer] box to learn more about \"Joint Injections: Care Instructions. \"     If you do not have an account, please click on the \"Sign Up Now\" link. Current as of: March 24, 2017  Content Version: 11.7  © 3337-3470 [employer]. Care instructions adapted under license by [facility] ([facility]). If you have questions about a medical condition or this instruction, always ask your healthcare professional. [address] any warranty or liability for your use of this information.

## 2018-10-19 ENCOUNTER — TELEPHONE (OUTPATIENT)
Dept: ORTHOPEDIC SURGERY | Age: 58
End: 2018-10-19

## 2018-10-25 ENCOUNTER — TELEPHONE (OUTPATIENT)
Dept: ORTHOPEDIC SURGERY | Age: 58
End: 2018-10-25

## 2018-10-25 NOTE — TELEPHONE ENCOUNTER
SPOKE WITH PATIENTS WIFE, WILL CALL BACK AND SCHEDULE SUPARTZ SERIES OF 3 OF LEFT KNEE AND WILL BE SELF PAY DUE TO INSURANCE. WILL CALL BACK TO SCHEDULE.

## 2018-11-08 ENCOUNTER — NURSE ONLY (OUTPATIENT)
Dept: ORTHOPEDIC SURGERY | Age: 58
End: 2018-11-08
Payer: COMMERCIAL

## 2018-11-08 VITALS — HEIGHT: 67 IN | BODY MASS INDEX: 39.24 KG/M2 | WEIGHT: 250 LBS

## 2018-11-08 DIAGNOSIS — M17.12 PRIMARY OSTEOARTHRITIS OF LEFT KNEE: Primary | ICD-10-CM

## 2018-11-15 ENCOUNTER — NURSE ONLY (OUTPATIENT)
Dept: ORTHOPEDIC SURGERY | Age: 58
End: 2018-11-15
Payer: COMMERCIAL

## 2018-11-15 DIAGNOSIS — M17.12 PRIMARY OSTEOARTHRITIS OF LEFT KNEE: Primary | ICD-10-CM

## 2018-11-21 ENCOUNTER — OFFICE VISIT (OUTPATIENT)
Dept: ORTHOPEDIC SURGERY | Age: 58
End: 2018-11-21
Payer: COMMERCIAL

## 2018-11-21 DIAGNOSIS — M17.12 PRIMARY OSTEOARTHRITIS OF LEFT KNEE: Primary | ICD-10-CM

## 2018-12-05 ENCOUNTER — APPOINTMENT (OUTPATIENT)
Dept: GENERAL RADIOLOGY | Age: 58
End: 2018-12-05
Payer: COMMERCIAL

## 2018-12-05 ENCOUNTER — HOSPITAL ENCOUNTER (EMERGENCY)
Age: 58
Discharge: HOME | End: 2018-12-05
Attending: EMERGENCY MEDICINE
Payer: COMMERCIAL

## 2018-12-05 VITALS
BODY MASS INDEX: 33.34 KG/M2 | WEIGHT: 220 LBS | TEMPERATURE: 98 F | HEART RATE: 81 BPM | SYSTOLIC BLOOD PRESSURE: 124 MMHG | RESPIRATION RATE: 14 BRPM | DIASTOLIC BLOOD PRESSURE: 81 MMHG | HEIGHT: 68 IN | OXYGEN SATURATION: 94 %

## 2018-12-05 DIAGNOSIS — S49.91XA INJURY OF RIGHT SHOULDER, INITIAL ENCOUNTER: ICD-10-CM
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
## 2018-12-05 PROCEDURE — 6360000002 HC RX W HCPCS: Performed by: PHYSICIAN ASSISTANT

## 2018-12-05 PROCEDURE — 6370000000 HC RX 637 (ALT 250 FOR IP): Performed by: PHYSICIAN ASSISTANT

## 2018-12-05 PROCEDURE — 99283 EMERGENCY DEPT VISIT LOW MDM: CPT

## 2018-12-05 PROCEDURE — 72040 X-RAY EXAM NECK SPINE 2-3 VW: CPT

## 2018-12-05 PROCEDURE — 73030 X-RAY EXAM OF SHOULDER: CPT

## 2018-12-05 RX ORDER — OXYCODONE HYDROCHLORIDE AND ACETAMINOPHEN 5; 325 MG/1; MG/1
1 TABLET ORAL EVERY 8 HOURS PRN
Qty: 12 TABLET | Refills: 0 | Status: SHIPPED | OUTPATIENT
Start: 2018-12-05 | End: 2018-12-08

## 2018-12-05 RX ORDER — OXYCODONE HYDROCHLORIDE AND ACETAMINOPHEN 5; 325 MG/1; MG/1
1 TABLET ORAL ONCE
Status: COMPLETED | OUTPATIENT
Start: 2018-12-05 | End: 2018-12-05

## 2018-12-05 RX ORDER — OXYCODONE HYDROCHLORIDE AND ACETAMINOPHEN 5; 325 MG/1; MG/1
2 TABLET ORAL ONCE
Status: COMPLETED | OUTPATIENT
Start: 2018-12-05 | End: 2018-12-05

## 2018-12-05 RX ADMIN — OXYCODONE AND ACETAMINOPHEN 2 TABLET: 5; 325 TABLET ORAL at 23:07

## 2018-12-05 RX ADMIN — OXYCODONE AND ACETAMINOPHEN 1 TABLET: 5; 325 TABLET ORAL at 22:05

## 2018-12-05 RX ADMIN — HYDROMORPHONE HYDROCHLORIDE 1 MG: 1 INJECTION, SOLUTION INTRAMUSCULAR; INTRAVENOUS; SUBCUTANEOUS at 19:47

## 2018-12-05 ASSESSMENT — PAIN SCALES - GENERAL
PAINLEVEL_OUTOF10: 5
PAINLEVEL_OUTOF10: 5
PAINLEVEL_OUTOF10: 10
PAINLEVEL_OUTOF10: 5
PAINLEVEL_OUTOF10: 5
PAINLEVEL_OUTOF10: 4
PAINLEVEL_OUTOF10: 6
PAINLEVEL_OUTOF10: 6

## 2018-12-05 ASSESSMENT — PAIN DESCRIPTION - LOCATION: LOCATION: SHOULDER

## 2018-12-05 ASSESSMENT — PAIN DESCRIPTION - PAIN TYPE: TYPE: ACUTE PAIN

## 2018-12-05 ASSESSMENT — PAIN DESCRIPTION - ORIENTATION: ORIENTATION: RIGHT

## 2018-12-06 NOTE — ED PROVIDER NOTES
SEPTIC ARTHRITIS, TENDON OR NEUROVASCULAR INJURY, thus I consider the discharge disposition reasonable. Vangie Pavon and I have discussed the diagnosis and risks, and we agree with discharging home to follow-up with their primary doctor or the referral orthopedist. We also discussed returning to the Emergency Department immediately if new or worsening symptoms occur. We have discussed the symptoms which are most concerning (e.g., changing or worsening pain, numbness, weakness) that necessitate immediate return. Final Impression  1. Fall down steps, initial encounter    2. Injury of right shoulder, initial encounter      Blood pressure 124/81, pulse 81, temperature 98 °F (36.7 °C), temperature source Oral, resp. rate 14, height 5' 8\" (1.727 m), weight 220 lb (99.8 kg), SpO2 94 %. DISPOSITION  Patient was discharged to home in good condition.           Harley Ramirez, [address]  12/06/18 1257

## 2018-12-06 NOTE — ED NOTES
Patient resting quietly in bed, no distress noted, respiratons even and unlabored. Patient states till having lisa pain and \"burning\" to shoulder blade, Seun CAMACHO aware.  Discussed imaging results with patient, patient states discussed with Seun Gonsales and states provider considering a CT scan, awaiting dispo per patient     Javier Esqueda RN  12/05/18 2100

## 2019-04-12 ENCOUNTER — OFFICE VISIT (OUTPATIENT)
Dept: ORTHOPEDIC SURGERY | Age: 59
End: 2019-04-12
Payer: COMMERCIAL

## 2019-04-12 VITALS — WEIGHT: 220.02 LBS | BODY MASS INDEX: 33.35 KG/M2 | HEIGHT: 68 IN

## 2019-04-12 DIAGNOSIS — M17.12 PRIMARY OSTEOARTHRITIS OF LEFT KNEE: Primary | ICD-10-CM

## 2019-04-12 PROCEDURE — 99214 OFFICE O/P EST MOD 30 MIN: CPT | Performed by: ORTHOPAEDIC SURGERY

## 2019-04-12 NOTE — PROGRESS NOTES
CHIEF COMPLAINT:    Chief Complaint   Patient presents with    Knee Pain     LEFT KNEE OA- FINISHED EUFLEXXA 11/2018-FELLS WORSE       HISTORY OF PRESENT ILLNESS:                The patient is a 63 y.o. male this patient presents today for follow-up regarding his LEFT knee. He has known fairly advanced osteoarthritis of his LEFT knee. He's had injection therapies including Visco supplementation which she finished about 5 months ago. He has also had cortisone injection which seem to offer him more relief. He had previous arthroscopy a little over a year ago for his knee. He's been through a lot in terms of medical history here recently after a fall in which he tore his rotator cuff. He had rotator cuff surgery about 4 months ago and ended up also cracking of tooth. He is getting back to his normal activities now. He thinks his knee pain has worsened since the fall.   Past Medical History:   Diagnosis Date    Diabetes mellitus ([address]     Pneumonia 8/2014    Reflux     past hx          The pain assessment was noted & is as follows:  Pain Assessment  Location of Pain: Knee  Location Modifiers: Left  Severity of Pain: 5  Quality of Pain: Dull, Aching, Sharp  Duration of Pain: Persistent  Frequency of Pain: Intermittent  Aggravating Factors: Walking, Stairs, Stretching, Bending  Limiting Behavior: Some  Relieving Factors: Rest  Result of Injury: No  Work-Related Injury: No  Are there other pain locations you wish to document?: No]      Work Status/Functionality:     Past Medical History: Medical history form was reviewed today & can be found in the media tab  Past Medical History:   Diagnosis Date    Diabetes mellitus ([address]     Pneumonia 8/2014    Reflux     past hx      Past Surgical History:     Past Surgical History:   Procedure Laterality Date    COLONOSCOPY      5/6/2011    HAND SURGERY Left     trigger thumb and fingers X3    KNEE ARTHROSCOPY Right 10/30/2014    KNEE ARTHROSCOPY Left technique. The needle was withdrawn and the puncture site sealed with a Band-Aid. Technique: Under sterile conditions a SonDragonflyte ultrasound unit with a variable frequency (6.0-15.0 MHz) linear transducer was used to localize the placement of a 22-gauge needle into the knee joint. Findings: Successful needle placement for knee injection. Final images were taken and saved for permanent record. The patient tolerated the injection well. T`he patient was instructed to call the office immediately if there is any pain, redness, warmth, fever, or chills. I have personally performed and/or participated in the history, exam and medical decision making and agree with all pertinent clinical information. I have also reviewed and agree with the past medical, family and social history unless otherwise noted. This dictation was performed with a verbal recognition program (DRAGON) and it was checked for errors. It is possible that there are still dictated errors within this office note. If so, please bring any errors to my attention for an addendum. All efforts were made to ensure that this office note is accurate.           Savi Keller MD

## 2019-04-12 NOTE — PROGRESS NOTES
Sherri Jacinto  [facility]#-[id number]  LOT#- [id number]  EXP:12/2022    4[employer] LIDOCAINE  YCW#-[id number]  LOT#--[id number]  EXP: 10/2020    2CC DEPO  NDC#-[id number]  LOT#- [id number]  EXP: 12/2020    SITE: LEFT KNEE

## 2022-03-30 ENCOUNTER — TELEPHONE (OUTPATIENT)
Dept: ORTHOPEDIC SURGERY | Age: 62
End: 2022-03-30

## 2022-03-30 NOTE — TELEPHONE ENCOUNTER
Imported Certified medical records for 1/1/2012 to present into [facility] for [employer].